# Patient Record
(demographics unavailable — no encounter records)

---

## 2025-01-30 NOTE — HEALTH RISK ASSESSMENT
[Very Good] : ~his/her~  mood as very good [No falls in past year] : Patient reported no falls in the past year [0] : 2) Feeling down, depressed, or hopeless: Not at all (0) [PHQ-2 Negative - No further assessment needed] : PHQ-2 Negative - No further assessment needed [Patient reported colonoscopy was abnormal] : Patient reported colonoscopy was abnormal [Employed] : employed [] :  [Sexually Active] : sexually active [Feels Safe at Home] : Feels safe at home [Fully functional (bathing, dressing, toileting, transferring, walking, feeding)] : Fully functional (bathing, dressing, toileting, transferring, walking, feeding) [Fully functional (using the telephone, shopping, preparing meals, housekeeping, doing laundry, using] : Fully functional and needs no help or supervision to perform IADLs (using the telephone, shopping, preparing meals, housekeeping, doing laundry, using transportation, managing medications and managing finances) [With Family] : lives with family [Reports normal functional visual acuity (ie: able to read med bottle)] : Reports normal functional visual acuity [[id number]] : 0 [High Risk Behavior] : no high risk behavior [Reports changes in hearing] : Reports no changes in hearing [Reports changes in vision] : Reports no changes in vision [MammogramDate] : 2024 [PapSmearDate] : 2024 [BoneDensityDate] : 2021 [ColonoscopyDate] : 2023 [ColonoscopyComments] : f/u in five years [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

## 2025-02-26 NOTE — HISTORY OF PRESENT ILLNESS
[FreeTextEntry8] : 65 yo f, with no sig pmhx, c/o elevated bp readings here with  today  recently noted elevated blood pressure readings  diastolic in 90s, systolics ranging from 140s to 160s has been checking at home  denies any chest pain, headaches or blurry vision  recently elevated at CPE as well  + family history of hypertension  denies any other associated symptoms denies any other complaints or concerns at this time

## 2025-02-26 NOTE — PHYSICAL EXAM
[Normal] : no acute distress, well nourished, well developed and well-appearing [Coordination Grossly Intact] : coordination grossly intact [No Focal Deficits] : no focal deficits

## 2025-02-26 NOTE — ASSESSMENT
[FreeTextEntry1] : reviewed recent ECG from 01/30/2025- NSR @ 67 bpm with no acute changes  discussed medication options with patient will start on losartan 25 mg daily  medication as prescribed, medication education done  advised BID monitoring of blood pressure  follow up with readings in 1-2 weeks follow up in office if sx persists or worsens patient verbalizes understanding and is stable upon d/c

## 2025-05-14 NOTE — HISTORY OF PRESENT ILLNESS
[FreeTextEntry1] : follow up [de-identified] : 64 yo PMHx HTN presents for a follow up on blood pressure has not increased to 50mg, has been taking losartan 25mg QD BP readings have been around 130s/80s denies any headache, dizziness, blurry vision also would like to check MMR titers for measles has appointment with cardiology in july, going to try to scheduled sooner appt in addition, has slight swelling to left lower leg/ankle denies any redness, pain, warmth states this seems to be chronic, worse at night after standing all day had US lower extremity last year, negative denies any chest pain/shortness of breath, recent travel

## 2025-05-14 NOTE — PHYSICAL EXAM
[Normal Rate] : normal rate  [Regular Rhythm] : with a regular rhythm [Normal S1, S2] : normal S1 and S2 [No Carotid Bruits] : no carotid bruits [Pedal Pulses Present] : the pedal pulses are present [Coordination Grossly Intact] : coordination grossly intact [No Focal Deficits] : no focal deficits [Normal Gait] : normal gait [Speech Grossly Normal] : speech grossly normal [Alert and Oriented x3] : oriented to person, place, and time [Normal Mood] : the mood was normal [Normal] : affect was normal and insight and judgment were intact [de-identified] : slight swelling to left lower extremity/ankle, no pitting edema

## 2025-05-14 NOTE — HISTORY OF PRESENT ILLNESS
[FreeTextEntry1] : follow up [de-identified] : 66 yo PMHx HTN presents for a follow up on blood pressure has not increased to 50mg, has been taking losartan 25mg QD BP readings have been around 130s/80s denies any headache, dizziness, blurry vision also would like to check MMR titers for measles has appointment with cardiology in july, going to try to scheduled sooner appt in addition, has slight swelling to left lower leg/ankle denies any redness, pain, warmth states this seems to be chronic, worse at night after standing all day had US lower extremity last year, negative denies any chest pain/shortness of breath, recent travel

## 2025-05-14 NOTE — PHYSICAL EXAM
[Normal Rate] : normal rate  [Regular Rhythm] : with a regular rhythm [Normal S1, S2] : normal S1 and S2 [No Carotid Bruits] : no carotid bruits [Pedal Pulses Present] : the pedal pulses are present [Coordination Grossly Intact] : coordination grossly intact [No Focal Deficits] : no focal deficits [Normal Gait] : normal gait [Speech Grossly Normal] : speech grossly normal [Alert and Oriented x3] : oriented to person, place, and time [Normal Mood] : the mood was normal [Normal] : affect was normal and insight and judgment were intact [de-identified] : slight swelling to left lower extremity/ankle, no pitting edema

## 2025-05-14 NOTE — REVIEW OF SYSTEMS
[Lower Ext Edema] : lower extremity edema [Negative] : Psychiatric [Chest Pain] : no chest pain [Palpitations] : no palpitations [Leg Claudication] : no leg claudication [Orthopnea] : no orthopnea [Paroxysmal Nocturnal Dyspnea] : no paroxysmal nocturnal dyspnea
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
## 2025-06-14 NOTE — PHYSICAL EXAM
[2+] : left 2+ [Ankle Swelling (On Exam)] : present [Ankle Swelling On The Left] : of the left ankle [Ankle Swelling On The Right] : mild [Varicose Veins Of Lower Extremities] : not present [] : not present [de-identified] : nad

## 2025-06-14 NOTE — PHYSICAL EXAM
[2+] : left 2+ [Ankle Swelling (On Exam)] : present [Ankle Swelling On The Left] : of the left ankle [Ankle Swelling On The Right] : mild [Varicose Veins Of Lower Extremities] : not present [] : not present [de-identified] : nad

## 2025-07-30 NOTE — PHYSICAL EXAM

## 2025-07-30 NOTE — HISTORY OF PRESENT ILLNESS
[FreeTextEntry1] : Ms. Nelson is a 66 y/o F seen today to [facility] care [facility].  Past Health history: HTN, LE edema, OA LE edema eval: vascular: spider veins, US neg for DVT: seen by vascular surgeon Dr. North: rec leg elevation, compression stockings PSH: negative  SH: no tobacco use, vaping, ETOH  FH Mother: Ariane Alejandre: , living, 88, low BP Father,living, 91, HTN, HPL, afib, open heart surgy: CABG/PCI sister,  age 10, congenital heart disease  sister, age 64, ? Graciela Alejandre nephew: Graciela alejandre allergies: NKA meds: Losartan 25 mg once daily BP today: 147/ 84 home BP readings: not consistent: 130-150/ 80 EKG: NSR, 80, nonspecific t wave changes Exercises: yoga, golf, walking, weekly strength class  Diet: no processed foods, admits to dairy use  Hydrates well diagnostics: no TTE or stress test in past, EKG: normal labwork:  2025:  lipid profile: Chol 192, HDL 84, TG 70, LDL 91 HBA1C 5.7

## 2025-07-30 NOTE — PHYSICAL EXAM

## 2025-07-30 NOTE — DISCUSSION/SUMMARY
[EKG obtained to assist in diagnosis and management of assessed problem(s)] : EKG obtained to assist in diagnosis and management of assessed problem(s) [FreeTextEntry1] : In summary, Ms. Nelson is a 66 y/o F seen today to [facility] care [facility].  Past Health history: HTN, LE edema, OA LE edema eval: vascular: spider veins, US neg for DVT: seen by vascular surgeon Dr. North: rec leg elevation, compression stockings PSH: negative  SH: no tobacco use, vaping, ETOH  FH Mother: Ariane Alejandre: , living, 88, low BP Father,living, 91, HTN, HPL, afib, open heart surgy: CABG/PCI sister,  age 10, congenital heart disease  sister, age 64, ? Graciela Alejandre nephew: Graciela alejandre allergies: NKA meds: Losartan 25 mg once daily BP today: 147/ 84 home BP readings: not consistent: 130-150/ 80 EKG: NSR, 80, nonspecific t wave changes Exercises: yoga, golf, walking Diet: no processed foods, admits to dairy use  Hydrates well diagnostics: no TTE or stress test in past, EKG: normal labwork:  2025:  lipid profile: Chol 192, HDL 84, TG 70, LDL 91 HBA1C 5.7  # HYpertension -continue Losartan 25 mg daily  Encouraged the patient to monitor blood pressure at home, keep a log, and report results back to us for evaluation. Based on results, we will adjust the regimen as necessary.  Encouraged the patient to monitor blood pressure at home, keep a log, and report results back to us for evaluation. Based on results, we will adjust the regimen as necessary. - Encouraged patient to participate in  healthy walking and exercise (when cleared by OB)  and eating habits, focusing on a Mediterranean style of eating and aiming for the recommended 150 minutes per week of moderate physical activity.  - Encouraged the patient to find healthy outlets and coping mechanisms to help manage stress, such as physical activity/exercise, reducing workload if possible, spending time with family and friends, engaging in an enjoyable hobby, or using meditation or mindfulness techniques.    # CV risk factors -above re:health promotion -baseline CV assessment: TTE and Exercise stress test: If both negative: plan for calcium score and carotid doppler, if + stress test: CTA angio heart -Lipid profile  f/u 6 mo

## 2025-07-30 NOTE — HISTORY OF PRESENT ILLNESS
[FreeTextEntry1] : Ms. Nelson is a 64 y/o F seen today to [facility] care in [facility].  Past Health history: HTN, LE edema, OA LE edema eval: vascular: spider veins, US neg for DVT: seen by vascular surgeon Dr. North: rec leg elevation, compression stockings PSH: negative  SH: no tobacco use, vaping, ETOH  FH Mother: Ariane Alejandre: , living, 88, low BP Father,living, 91, HTN, HPL, afib, open heart surgy: CABG/PCI sister,  age 10, congenital heart disease  sister, age 64, ? Graciela Alejandre nephew: Graciela alejandre allergies: NKA meds: Losartan 25 mg once daily BP today: 147/ 84 home BP readings: not consistent: 130-150/ 80 EKG: NSR, 80, nonspecific t wave changes Exercises: yoga, golf, walking, weekly strength class  Diet: no processed foods, admits to dairy use  Hydrates well diagnostics: no TTE or stress test in past, EKG: normal labwork:  2025:  lipid profile: Chol 192, HDL 84, TG 70, LDL 91 HBA1C 5.7

## 2025-07-30 NOTE — END OF VISIT
[Time Spent: ___ minutes] : I have spent [unfilled] minutes of time on the encounter which excludes teaching and separately reported services. [FreeTextEntry3] :   I, Dr. Carly Snowden, personally performed the evaluation and management (E/M) services for this new patient.  That E/M includes conducting the initial examination, assessing all conditions, and establishing the plan of care.  Today, my ACP, was here to observe my evaluation and management services for this patient to be followed going forward.

## 2025-07-30 NOTE — DISCUSSION/SUMMARY
[EKG obtained to assist in diagnosis and management of assessed problem(s)] : EKG obtained to assist in diagnosis and management of assessed problem(s) [FreeTextEntry1] : In summary, Ms. Nelson is a 64 y/o F seen today to [facility] care in [facility].  Past Health history: HTN, LE edema, OA LE edema eval: vascular: spider veins, US neg for DVT: seen by vascular surgeon Dr. North: rec leg elevation, compression stockings PSH: negative  SH: no tobacco use, vaping, ETOH  FH Mother: Ariane Alejandre: , living, 88, low BP Father,living, 91, HTN, HPL, afib, open heart surgy: CABG/PCI sister,  age 10, congenital heart disease  sister, age 64, ? Graciela Alejandre nephew: Graciela alejandre allergies: NKA meds: Losartan 25 mg once daily BP today: 147/ 84 home BP readings: not consistent: 130-150/ 80 EKG: NSR, 80, nonspecific t wave changes Exercises: yoga, golf, walking Diet: no processed foods, admits to dairy use  Hydrates well diagnostics: no TTE or stress test in past, EKG: normal labwork:  2025:  lipid profile: Chol 192, HDL 84, TG 70, LDL 91 HBA1C 5.7  # HYpertension -continue Losartan 25 mg daily  Encouraged the patient to monitor blood pressure at home, keep a log, and report results back to us for evaluation. Based on results, we will adjust the regimen as necessary.  Encouraged the patient to monitor blood pressure at home, keep a log, and report results back to us for evaluation. Based on results, we will adjust the regimen as necessary. - Encouraged patient to participate in  healthy walking and exercise (when cleared by OB)  and eating habits, focusing on a Mediterranean style of eating and aiming for the recommended 150 minutes per week of moderate physical activity.  - Encouraged the patient to find healthy outlets and coping mechanisms to help manage stress, such as physical activity/exercise, reducing workload if possible, spending time with family and friends, engaging in an enjoyable hobby, or using meditation or mindfulness techniques.    # CV risk factors -above re:health promotion -baseline CV assessment: TTE and Exercise stress test: If both negative: plan for calcium score and carotid doppler, if + stress test: CTA angio heart -Lipid profile  f/u 6 mo

## 2025-07-30 NOTE — PHYSICAL EXAM

## 2025-07-30 NOTE — PHYSICAL EXAM
